# Patient Record
Sex: FEMALE | Race: WHITE | NOT HISPANIC OR LATINO | Employment: STUDENT | URBAN - METROPOLITAN AREA
[De-identification: names, ages, dates, MRNs, and addresses within clinical notes are randomized per-mention and may not be internally consistent; named-entity substitution may affect disease eponyms.]

---
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
## 2024-07-02 ENCOUNTER — TRANSFERRED RECORDS (OUTPATIENT)
Dept: HEALTH INFORMATION MANAGEMENT | Facility: CLINIC | Age: 24
End: 2024-07-02
Payer: COMMERCIAL

## 2024-07-07 ENCOUNTER — HOSPITAL ENCOUNTER (EMERGENCY)
Facility: CLINIC | Age: 24
Discharge: HOME | End: 2024-07-08
Attending: EMERGENCY MEDICINE | Admitting: EMERGENCY MEDICINE
Payer: COMMERCIAL

## 2024-07-07 VITALS
BODY MASS INDEX: 50.91 KG/M2 | DIASTOLIC BLOOD PRESSURE: 93 MMHG | WEIGHT: 220 LBS | OXYGEN SATURATION: 98 % | HEIGHT: 55 IN | HEART RATE: 95 BPM | TEMPERATURE: 98.3 F | SYSTOLIC BLOOD PRESSURE: 157 MMHG | RESPIRATION RATE: 16 BRPM

## 2024-07-07 DIAGNOSIS — R51.9 HEADACHE, UNSPECIFIED HEADACHE TYPE: ICD-10-CM

## 2024-07-07 DIAGNOSIS — G89.4 CHRONIC PAIN DISORDER: ICD-10-CM

## 2024-07-07 LAB
FLUAV RNA SPEC QL NAA+PROBE: NEGATIVE
FLUBV RNA RESP QL NAA+PROBE: NEGATIVE
RSV RNA SPEC NAA+PROBE: NEGATIVE
SARS-COV-2 RNA RESP QL NAA+PROBE: NEGATIVE

## 2024-07-07 PROCEDURE — 87637 SARSCOV2&INF A&B&RSV AMP PRB: CPT | Performed by: EMERGENCY MEDICINE

## 2024-07-07 PROCEDURE — 99284 EMERGENCY DEPT VISIT MOD MDM: CPT | Mod: 25

## 2024-07-07 ASSESSMENT — COLUMBIA-SUICIDE SEVERITY RATING SCALE - C-SSRS
1. IN THE PAST MONTH, HAVE YOU WISHED YOU WERE DEAD OR WISHED YOU COULD GO TO SLEEP AND NOT WAKE UP?: NO
6. HAVE YOU EVER DONE ANYTHING, STARTED TO DO ANYTHING, OR PREPARED TO DO ANYTHING TO END YOUR LIFE?: NO
2. HAVE YOU ACTUALLY HAD ANY THOUGHTS OF KILLING YOURSELF IN THE PAST MONTH?: NO

## 2024-07-07 ASSESSMENT — ACTIVITIES OF DAILY LIVING (ADL)
ADLS_ACUITY_SCORE: 35
ADLS_ACUITY_SCORE: 35

## 2024-07-08 ENCOUNTER — APPOINTMENT (OUTPATIENT)
Dept: CT IMAGING | Facility: CLINIC | Age: 24
End: 2024-07-08
Attending: EMERGENCY MEDICINE
Payer: COMMERCIAL

## 2024-07-08 PROCEDURE — 250N000013 HC RX MED GY IP 250 OP 250 PS 637: Performed by: EMERGENCY MEDICINE

## 2024-07-08 PROCEDURE — 70450 CT HEAD/BRAIN W/O DYE: CPT

## 2024-07-08 RX ORDER — ACETAMINOPHEN 500 MG
1000 TABLET ORAL ONCE
Status: COMPLETED | OUTPATIENT
Start: 2024-07-08 | End: 2024-07-08

## 2024-07-08 RX ADMIN — ACETAMINOPHEN 1000 MG: 500 TABLET, FILM COATED ORAL at 01:40

## 2024-07-08 ASSESSMENT — ACTIVITIES OF DAILY LIVING (ADL)
ADLS_ACUITY_SCORE: 35
ADLS_ACUITY_SCORE: 35

## 2024-07-08 NOTE — ED TRIAGE NOTES
Pt anticoagulated on eliquis for blood clot in right arm reports left sided headache for past two hours. Pt reports dizziness and is seeing floaters. Pt has hx of migraines but has not taken any otc meds.      Triage Assessment (Adult)       Row Name 07/07/24 2122          Respiratory WDL    Respiratory WDL WDL        Cardiac WDL    Cardiac WDL WDL        Cognitive/Neuro/Behavioral WDL    Cognitive/Neuro/Behavioral WDL X  left sided headache

## 2024-07-08 NOTE — ED PROVIDER NOTES
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  Patient states that she had a headache that started around 7 PM tonight she was not doing anything exertional.  She describes it as pain on the left side of her head behind her eye.  It is noted on previous charts that she has had blurry vision seen neuro-ophthalmology, neurology, neurosurgery.  She states she gets her migraines every day but she does not take anything for them.  She has not had any fevers, cough, sore throat.  She states that she has been having problems with dizziness.  Her neuro-ophthalmologist gave her some injections thinking it was a tendinitis.  She does not wear glasses or contacts.  She has not had any fevers.  Is noted she has had swelling in her hands and her feet.  DVT he was diagnosed in May and has been followed by hematology.  Her home was in [place].  She was at [facility] this weekend has been seen in multiple medical settings in the past couple weeks and was on her way home and her aunt brought her here for evaluation of headache.    Independent Historian   None    Review of External Notes   Urgent care note from [facility] 7/6/2024 yesterday for left foot pain no injury x-rays were negative.  Emergency room at [facility] 7/1/2024 patient was seen for dizziness visual disturbance.  She had acute on chronic dizziness and 4-day history of blurry vision.  Had been seen at the neurologist office on 628/24 for this.  They prescribed her meclizine.    7/3/24 Dr. Stiles [place] [facility] patient was seen for osteoarthritis of both knees  7/2/24 Dr. Barron 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    Physical Exam     No data found.    Patient Vitals for the past 24 hrs:   BP Temp Pulse Resp SpO2 Height Weight   07/07/24 2121 (!) 157/93 98.3  F (36.8  C) 95 16 98 % 1.372 m (4' 6\") 99.8 kg (220 lb)     Physical Exam    Physical Exam   Constitutional:  Patient is oriented to person, place, and time. They appear well-developed and well-nourished.     HENT:   Mouth/Throat:   Oropharynx is clear and moist.   Eyes:    Conjunctivae normal and EOM are normal. Pupils are equal, round, and reactive to light. No nystagmus.  Neck:    Normal range of motion.   Cardiovascular: Normal rate, regular rhythm and normal heart sounds.  Exam reveals no gallop and no friction rub.  No murmur heard.  Pulmonary/Chest:  Effort normal and breath sounds normal. Patient has no wheezes. Patient has no rales.   Abdominal:   Soft. Bowel sounds are normal. Patient exhibits no mass. There is no tenderness. There is no rebound and no guarding.   Musculoskeletal:  Normal range of motion. Patient exhibits no edema.   Neurological:   Patient is alert and oriented to person, place, and time. Patient has normal strength. No cranial nerve deficit or sensory deficit. NIHSS 0.  Skin:   Skin is warm and dry. No rash noted. No erythema.   Psychiatric:   Patient has a normal mood    Diagnostics     Lab Results   Labs Ordered and Resulted from Time of ED Arrival to Time " of ED Departure   INFLUENZA A/B, RSV, & SARS-COV2 PCR - Normal       Result Value    Influenza A PCR Negative      Influenza B PCR Negative      RSV PCR Negative      SARS CoV2 PCR Negative         Imaging   Head CT w/o contrast   Final Result   IMPRESSION:   1.  No CT finding of a mass, hemorrhage or focal area suggestive of acute infarct.          Independent Interpretation   None    ED Course      Medications Administered   Medications   acetaminophen (TYLENOL) tablet 1,000 mg (1,000 mg Oral $Given 7/8/24 0140)       Procedures   Procedures     Discussion of Management   None    ED Course        Optional/Additional Documentation  None    Medical Decision Making / Diagnosis     CMS Diagnoses: None    MIPS       None    MDM   Kika Boykin is a 23 year old very pleasant female who presents with headache. She has a very complex history given her [facility]. She has multiple specialists that she follows with including neuro/NS/rheumatology/Hematology. She presents with daily headaches. She had a very reassuring exam, no focal findings, no fever, did not appear in any acute distress. She was swabed for COVID given she is coming home from [facility] and the increasing numbers of COVD. Her swab was negative. Given her anticoagulation use, I did a CT head. There are no acute abnormals not a thunderclap headache or worst headache of her life. She gets daily headaches, so discussed following up with neuro, she would like a second opinion. She lives in [place], so gave her a referral her which she said she could do. I have also provided a second pain clinic referral as she does not have an appointment until November to the one she was referred to. At this time, she is safe to discontinue but close follow up indicated.    Disposition   The patient was discharged.     Diagnosis     ICD-10-CM    1. Headache, unspecified headache type  R51.9       2. Chronic pain disorder  G89.4            Discharge  Medications   Discharge Medication List as of 7/8/2024  2:34 AM            MD Damaris Larios, Saida Burch MD  07/09/24 1545

## 2024-07-09 ENCOUNTER — MEDICAL CORRESPONDENCE (OUTPATIENT)
Dept: HEALTH INFORMATION MANAGEMENT | Facility: CLINIC | Age: 24
End: 2024-07-09
Payer: COMMERCIAL

## 2024-07-10 ENCOUNTER — TRANSCRIBE ORDERS (OUTPATIENT)
Dept: OTHER | Age: 24
End: 2024-07-10

## 2024-07-10 DIAGNOSIS — M79.602 PARESTHESIA AND PAIN OF BOTH UPPER EXTREMITIES: ICD-10-CM

## 2024-07-10 DIAGNOSIS — Q79.60 EDS (EHLERS-DANLOS SYNDROME): Primary | ICD-10-CM

## 2024-07-10 DIAGNOSIS — M79.601 PARESTHESIA AND PAIN OF BOTH UPPER EXTREMITIES: ICD-10-CM

## 2024-07-10 DIAGNOSIS — G89.29 CHRONIC PAIN OF MULTIPLE JOINTS: ICD-10-CM

## 2024-07-10 DIAGNOSIS — M25.50 CHRONIC PAIN OF MULTIPLE JOINTS: ICD-10-CM

## 2024-07-10 DIAGNOSIS — R20.2 PARESTHESIA AND PAIN OF BOTH UPPER EXTREMITIES: ICD-10-CM

## 2025-01-19 ENCOUNTER — HEALTH MAINTENANCE LETTER (OUTPATIENT)
Age: 25
End: 2025-01-19